# Patient Record
Sex: FEMALE | Race: WHITE | NOT HISPANIC OR LATINO | URBAN - METROPOLITAN AREA
[De-identification: names, ages, dates, MRNs, and addresses within clinical notes are randomized per-mention and may not be internally consistent; named-entity substitution may affect disease eponyms.]

---

## 2017-04-03 ENCOUNTER — APPOINTMENT (RX ONLY)
Dept: URBAN - METROPOLITAN AREA CLINIC 291 | Facility: CLINIC | Age: 47
Setting detail: DERMATOLOGY
End: 2017-04-03

## 2017-04-03 DIAGNOSIS — I78.8 OTHER DISEASES OF CAPILLARIES: ICD-10-CM

## 2017-04-03 PROBLEM — L85.3 XEROSIS CUTIS: Status: ACTIVE | Noted: 2017-04-03

## 2017-04-03 PROBLEM — L29.8 OTHER PRURITUS: Status: ACTIVE | Noted: 2017-04-03

## 2017-04-03 PROBLEM — J30.1 ALLERGIC RHINITIS DUE TO POLLEN: Status: ACTIVE | Noted: 2017-04-03

## 2017-04-03 PROBLEM — L55.1 SUNBURN OF SECOND DEGREE: Status: ACTIVE | Noted: 2017-04-03

## 2017-04-03 PROCEDURE — ? VARILITE

## 2017-04-03 PROCEDURE — ? COUNSELING

## 2017-04-03 ASSESSMENT — LOCATION ZONE DERM: LOCATION ZONE: NOSE

## 2017-04-03 ASSESSMENT — LOCATION SIMPLE DESCRIPTION DERM: LOCATION SIMPLE: NOSE

## 2017-04-03 ASSESSMENT — LOCATION DETAILED DESCRIPTION DERM: LOCATION DETAILED: NASAL DORSUM

## 2017-04-03 NOTE — HPI: COSMETIC (LASER BROWN SPOTS)
Have You Had Laser Removal Of Brown Spots Before?: has not had previous treatment
When Outside In The Sun, Do You...: always burns, never tans
Additional History: Pt has sensitive skin and is here to consult about age spots and spider veins.

## 2017-04-03 NOTE — PROCEDURE: VARILITE
Spot Size In Mm: 1.0 mm
Treatment Number: 1
Energy Density: 20.2 J/cm
Post-Care Instructions: I reviewed with the patient in detail post-care instructions. Patient should avoid sunlight and wear sun protection.
Wavelength: 940 nm
Consent: Written consent obtained, risks reviewed including but not limited to crusting, scabbing, blistering, scarring, darker or lighter pigmentary change, systemic reactions, ulceration, incidental hair removal, bruising, and/or incomplete removal.
Detail Level: Zone
Price (Use Numbers Only, No Special Characters Or $): 250.00
Pulse Duration: 5
Wavelength: 532 nm
Number Of Pulses: 544 counter
Frequency In Hz: 3
External Cooling Fan Speed: 0
Energy Density: 18.0 J/cm2
Pulse Duration: 42
Endpoint erythema. Vaseline and ice applied. Post care reviewed with patient.